# Patient Record
Sex: FEMALE | Race: WHITE | NOT HISPANIC OR LATINO | Employment: FULL TIME | URBAN - METROPOLITAN AREA
[De-identification: names, ages, dates, MRNs, and addresses within clinical notes are randomized per-mention and may not be internally consistent; named-entity substitution may affect disease eponyms.]

---

## 2017-06-22 ENCOUNTER — TRANSFERRED RECORDS (OUTPATIENT)
Dept: HEALTH INFORMATION MANAGEMENT | Facility: CLINIC | Age: 57
End: 2017-06-22

## 2017-06-22 LAB — PAP SMEAR - HIM PATIENT REPORTED: NEGATIVE

## 2018-04-25 ENCOUNTER — TELEPHONE (OUTPATIENT)
Dept: OTHER | Facility: CLINIC | Age: 58
End: 2018-04-25

## 2018-04-25 NOTE — TELEPHONE ENCOUNTER
4/25/2018    Call Regarding Onboarding ARE CHOICES     Attempt 1    Message on voicemail     Comments:           Outreach   AT

## 2018-10-03 ENCOUNTER — OFFICE VISIT (OUTPATIENT)
Dept: FAMILY MEDICINE | Facility: CLINIC | Age: 58
End: 2018-10-03
Payer: COMMERCIAL

## 2018-10-03 VITALS
WEIGHT: 121 LBS | HEIGHT: 63 IN | DIASTOLIC BLOOD PRESSURE: 60 MMHG | SYSTOLIC BLOOD PRESSURE: 120 MMHG | OXYGEN SATURATION: 96 % | BODY MASS INDEX: 21.44 KG/M2 | HEART RATE: 83 BPM

## 2018-10-03 DIAGNOSIS — F42.9 OBSESSIVE-COMPULSIVE DISORDER, UNSPECIFIED TYPE: Primary | ICD-10-CM

## 2018-10-03 DIAGNOSIS — G43.809 OTHER MIGRAINE WITHOUT STATUS MIGRAINOSUS, NOT INTRACTABLE: ICD-10-CM

## 2018-10-03 DIAGNOSIS — Z23 NEED FOR TDAP VACCINATION: ICD-10-CM

## 2018-10-03 DIAGNOSIS — Z80.3 FAMILY HISTORY OF MALIGNANT NEOPLASM OF BREAST: ICD-10-CM

## 2018-10-03 DIAGNOSIS — Z12.31 ENCOUNTER FOR SCREENING MAMMOGRAM FOR BREAST CANCER: ICD-10-CM

## 2018-10-03 DIAGNOSIS — F33.0 MILD RECURRENT MAJOR DEPRESSION (H): ICD-10-CM

## 2018-10-03 PROCEDURE — 90715 TDAP VACCINE 7 YRS/> IM: CPT | Performed by: FAMILY MEDICINE

## 2018-10-03 PROCEDURE — 90471 IMMUNIZATION ADMIN: CPT | Performed by: FAMILY MEDICINE

## 2018-10-03 PROCEDURE — 99203 OFFICE O/P NEW LOW 30 MIN: CPT | Mod: 25 | Performed by: FAMILY MEDICINE

## 2018-10-03 RX ORDER — SERTRALINE HYDROCHLORIDE 100 MG/1
50 TABLET, FILM COATED ORAL DAILY
Qty: 45 TABLET | Refills: 4 | Status: SHIPPED | OUTPATIENT
Start: 2018-10-03 | End: 2019-12-12

## 2018-10-03 RX ORDER — SUMATRIPTAN 100 MG/1
100 TABLET, FILM COATED ORAL
Qty: 18 TABLET | Refills: 5 | Status: SHIPPED | OUTPATIENT
Start: 2018-10-03

## 2018-10-03 NOTE — Clinical Note
[facility] colonoscopy 9/2009 normal Pap normal 6/22/17 [facility], Normal HPV Mammogram normal 6/22/17 [facility]

## 2018-10-03 NOTE — MR AVS SNAPSHOT
"              After Visit Summary   10/3/2018    Kasandra Stewart    MRN: [id number]           Patient Information     Date Of Birth          1960        Visit Information        Provider Department      10/3/2018 10:00 AM Sonny Miranda MD [facility]        Today's Diagnoses     Obsessive-compulsive disorder, unspecified type    -  1    Mild recurrent major depression (H)        Other migraine without status migrainosus, not intractable        Need for Tdap vaccination           Follow-ups after your visit        Who to contact     If you have questions or need follow up information about today's clinic visit or your schedule please contact [facility] directly at [phone].  Normal or non-critical lab and imaging results will be communicated to you by MyChart, letter or phone within 4 business days after the clinic has received the results. If you do not hear from us within 7 days, please contact the clinic through MyChart or phone. If you have a critical or abnormal lab result, we will notify you by phone as soon as possible.  Submit refill requests through [email or web address] or call your pharmacy and they will forward the refill request to us. Please allow 3 business days for your refill to be completed.          Additional Information About Your Visit        MyChart Information     [email or web address] lets you send messages to your doctor, view your test results, renew your prescriptions, schedule appointments and more. To sign up, go to [email or web address] . Click on \"Log in\" on the left side of the screen, which will take you to the Welcome page. Then click on \"Sign up Now\" on the right side of the page.     You will be asked to enter the access code listed below, as well as some personal information. Please follow the directions to create your username and password.     Your access code is: [email or web address]  Expires: 2019  9:49 AM     Your access code will  in 90 days. If you " "need help or a new code, please call your [facility] clinic or [phone].        Care EveryWhere ID     This is your Care EveryWhere ID. This could be used by other organizations to access your [facility] medical records  [id number]        Your Vitals Were     Pulse Height Pulse Oximetry Breastfeeding? BMI (Body Mass Index)       83 5' 3\" (1.6 m) 96% No 21.43 kg/m2        Blood Pressure from Last 3 Encounters:   10/03/18 120/60   11/11/11 102/75   10/01/07 104/60    Weight from Last 3 Encounters:   10/03/18 121 lb (54.9 kg)   11/11/11 127 lb (57.6 kg)   10/01/07 117 lb (53.1 kg)              We Performed the Following     TDAP VACCINE (ADACEL)          Today's Medication Changes          These changes are accurate as of 10/3/18 10:28 AM.  If you have any questions, ask your nurse or doctor.               These medicines have changed or have updated prescriptions.        Dose/Directions    sertraline 100 MG tablet   Commonly known as:  ZOLOFT   This may have changed:  See the new instructions.   Used for:  Obsessive-compulsive disorder, unspecified type, Mild recurrent major depression (H)   Changed by:  Sonny Miranda MD        Dose:  50 mg   Take 0.5 tablets (50 mg) by mouth daily   Quantity:  45 tablet   Refills:  4       SUMAtriptan 100 MG tablet   Commonly known as:  IMITREX   This may have changed:  See the new instructions.   Used for:  Other migraine without status migrainosus, not intractable   Changed by:  Sonny Miranda MD        Dose:  100 mg   Take 1 tablet (100 mg) by mouth at onset of headache for migraine May repeat in 2 hours. Max 2 tablets/24 hours.   Quantity:  18 tablet   Refills:  5            Where to get your medicines      These medications were sent to [facility]/pharmacy #[id number] - [place], [place] - [address]  [address] [address] [address]    Hours:  [facility] Phone:  [phone]     sertraline 100 MG tablet    SUMAtriptan 100 MG tablet                " Primary Care Provider Office Phone # Fax #    Sonny Miranda [phone] [address]  [facility] [address]        Equal Access to Services     RONNY AGRAWAL : Hadii aad ku hadkaylao Soomaali, waaxda luqadaha, qaybta kaalmada adeegyada, kaushal del rosariokira werner. So [facility] [phone].    ATENCIÓN: Si habla español, tiene a barriga disposición servicios gratuitos de asistencia lingüística. Llame al [phone].    We comply with applicable federal civil rights laws and [place] laws. We do not discriminate on the basis of race, color, national origin, age, disability, sex, sexual orientation, or gender identity.            Thank you!     Thank you for choosing [facility]  for your care. Our goal is always to provide you with excellent care. Hearing back from our patients is one way we can continue to improve our services. Please take a few minutes to complete the written survey that you may receive in the mail after your visit with us. Thank you!             Your Updated Medication List - Protect others around you: Learn how to safely use, store and throw away your medicines at www.disposemymeds.org.          This list is accurate as of 10/3/18 10:28 AM.  Always use your most recent med list.                   Brand Name Dispense Instructions for use Diagnosis    sertraline 100 MG tablet    ZOLOFT    45 tablet    Take 0.5 tablets (50 mg) by mouth daily    Obsessive-compulsive disorder, unspecified type, Mild recurrent major depression (H)       SUMAtriptan 100 MG tablet    IMITREX    18 tablet    Take 1 tablet (100 mg) by mouth at onset of headache for migraine May repeat in 2 hours. Max 2 tablets/24 hours.    Other migraine without status migrainosus, not intractable

## 2018-10-03 NOTE — PROGRESS NOTES
SUBJECTIVE:   Kasandra Stewart is a 58 year old female who presents to clinic today for the following health issues:    Establish Care.     Patient with history of depression and obsessive-compulsive disorder. Has been stable on sertraline for multiple years.     Patient reports two episodes of diarrhea with urgency this past year. She has inconsistent stools and bowel movements as well. Denies blood is stools.     Patient with history of migraines. The migraines are triggered by irregular sleep patterns and neck tightness. The pain is one sided and located behind her eye. Well controlled with Imitrex. Denies photophobia, phonophobia, nausea, vomiting.     Patient reports left little finger tenderness. She wonders if this is related to arthritis.     Problem list and histories reviewed & adjusted, as indicated.  Additional history: as documented    Patient Active Problem List   Diagnosis     Obsessive-compulsive disorder     Mild recurrent major depression (H)     Other migraine without status migrainosus, not intractable     Past Surgical History:   Procedure Laterality Date     GYN SURGERY         Social History   Substance Use Topics     Smoking status: Never Smoker     Smokeless tobacco: Never Used     Alcohol use No     Family History   Problem Relation Age of Onset     Breast Cancer Mother       when she was 49     Breast Cancer Father      had breast  cancer 2004     HEART DISEASE Maternal Grandmother      HEART DISEASE Maternal Grandfather      Cerebrovascular Disease Paternal Grandfather            Reviewed and updated as needed this visit by clinical staff  Tobacco  Allergies  Meds  Problems  Med Hx  Surg Hx  Fam Hx  Soc Hx        Reviewed and updated as needed this visit by Provider  Problems         ROS:  GI: as noted above   MUSCULOSKELETAL: as noted above   NEURO: POSITIVE for HX headaches-migraine  PSYCHIATRIC: NEGATIVE for changes in mood or affect    This document serves as a record of  "the services and decisions personally performed and made by Sonny Miranda MD. It was created on his behalf by Autumn Andersen, a trained medical scribe. The creation of this document is based on the provider's statements to the medical scribe.  Autumn Andersen 10:10 AM October 3, 2018    OBJECTIVE:     /60 (BP Location: Right arm, Patient Position: Chair, Cuff Size: Adult Regular)  Pulse 83  Ht 1.6 m (5' 3\")  Wt 54.9 kg (121 lb)  SpO2 96%  Breastfeeding? No  BMI 21.43 kg/m2  Body mass index is 21.43 kg/(m^2).  GENERAL: healthy, alert and no distress  RESP: lungs clear to auscultation - no rales, rhonchi or wheezes  CV: regular rate and rhythm, normal S1 S2, no S3 or S4, no murmur, click or rub, no peripheral edema and peripheral pulses strong    ASSESSMENT/PLAN:     1. Obsessive-compulsive disorder, unspecified type  Continue current medication.  - sertraline (ZOLOFT) 100 MG tablet; Take 0.5 tablets (50 mg) by mouth daily  Dispense: 45 tablet; Refill: 4    2. Mild recurrent major depression (H)  Continue current medication as this is stable.  - sertraline (ZOLOFT) 100 MG tablet; Take 0.5 tablets (50 mg) by mouth daily  Dispense: 45 tablet; Refill: 4    3. Other migraine without status migrainosus, not intractable  Continue Imitrex as needed.  - SUMAtriptan (IMITREX) 100 MG tablet; Take 1 tablet (100 mg) by mouth at onset of headache for migraine May repeat in 2 hours. Max 2 tablets/24 hours.  Dispense: 18 tablet; Refill: 5    4. Need for Tdap vaccination  - TDAP VACCINE (ADACEL)    The information in this document, created by the medical scribe for me, accurately reflects the services I personally performed and the decisions made by me. I have reviewed and approved this document for accuracy prior to leaving the patient care area.  October 3, 2018 10:30 AM    Sonny Miranda MD  [facility]    "

## 2018-10-31 ENCOUNTER — HOSPITAL ENCOUNTER (OUTPATIENT)
Dept: MAMMOGRAPHY | Facility: CLINIC | Age: 58
Discharge: HOME | End: 2018-10-31
Attending: FAMILY MEDICINE | Admitting: FAMILY MEDICINE
Payer: COMMERCIAL

## 2018-10-31 DIAGNOSIS — Z80.3 FAMILY HISTORY OF MALIGNANT NEOPLASM OF BREAST: ICD-10-CM

## 2018-10-31 DIAGNOSIS — Z12.31 ENCOUNTER FOR SCREENING MAMMOGRAM FOR BREAST CANCER: ICD-10-CM

## 2018-10-31 PROCEDURE — 77067 SCR MAMMO BI INCL CAD: CPT

## 2018-11-08 ENCOUNTER — TELEPHONE (OUTPATIENT)
Dept: FAMILY MEDICINE | Facility: CLINIC | Age: 58
End: 2018-11-08

## 2018-11-08 NOTE — TELEPHONE ENCOUNTER
Pt calling stating she got a different  for her sertraline and wondering if she can change medications or get the brand name due to diarrhea.  She noticed that since the pharmacy dispensed a different  has had diarrhea issues but never did in the past.  Advised she call the pharmacy to discuss the issue with the  they use and see if they can order from the old  as the clinic does not have control over that.  If this is not an option she was advise to schedule a telephone visit to discuss a possibly change in medication.    Pt expressed understanding and acceptance of the plan.  Pt had no further questions at this time.  Advised can call back to clinic at any time with concerns.       Adriana Flowers RN, BSN

## 2019-10-10 ENCOUNTER — TELEPHONE (OUTPATIENT)
Dept: FAMILY MEDICINE | Facility: CLINIC | Age: 59
End: 2019-10-10

## 2019-10-10 NOTE — LETTER
October 10, 2019      Kasandra Stewart  [address]  [place] [address]        Dear Kasandra,     Our records indicate that you may be due for preventative health care services.  Please make an appointment or call to set up the following tests as recommended.  If you have already had this testing done at another clinic please contact us to help us update our records to reflect the preventative care that you have had.    Colon cancer screen (colonoscopy) - Recommended every ten years for everyone age 50 and older. Screening is done by a colonoscopy every 10 years starting at age 50 or earlier if you have a family history of colon cancer.  If you cannot or do not want to have a colonoscopy you can opt to do an annual fecal occult blood immunoassay test (FIT stool test). We strongly urge our patients to consider having a colonoscopy done, which is the best screening test available and only needs to be done every 10 years if normal.                                                                                                                                               Thank you for choosing [facility]. We appreciate the opportunity to serve you and look forward to supporting your healthcare needs in the future.    If you have any questions or concerns, please contact us at [phone].        Sincerely,      Sonny Miranda MD

## 2019-12-12 DIAGNOSIS — F33.0 MILD RECURRENT MAJOR DEPRESSION (H): ICD-10-CM

## 2019-12-12 DIAGNOSIS — F42.9 OBSESSIVE-COMPULSIVE DISORDER, UNSPECIFIED TYPE: ICD-10-CM

## 2019-12-12 RX ORDER — SERTRALINE HYDROCHLORIDE 100 MG/1
TABLET, FILM COATED ORAL
Qty: 15 TABLET | Refills: 0 | Status: SHIPPED | OUTPATIENT
Start: 2019-12-12 | End: 2020-01-09

## 2020-01-09 DIAGNOSIS — F33.0 MILD RECURRENT MAJOR DEPRESSION (H): ICD-10-CM

## 2020-01-09 DIAGNOSIS — F42.9 OBSESSIVE-COMPULSIVE DISORDER, UNSPECIFIED TYPE: ICD-10-CM

## 2020-01-09 RX ORDER — SERTRALINE HYDROCHLORIDE 100 MG/1
TABLET, FILM COATED ORAL
Qty: 30 TABLET | Refills: 0 | Status: SHIPPED | OUTPATIENT
Start: 2020-01-09

## 2020-01-09 NOTE — TELEPHONE ENCOUNTER
LM again today for call back-   Pt has received angelica RF and not followed up.     Do you want to give another limited RF     LOV 10/3/2018    Letter sent today     Naya Ureña RN

## 2020-01-09 NOTE — LETTER
January 9, 2020      Kasandra Stewart  [address]  [place] [facility] [address]        Dear Kasandra,     We received a request to refill your sertraline again today.   We have made several attempts to reach you by phone but have not heard back from you. You are over due for a recheck appointment with your provider.  You have not been seen since October of 2018.     You can schedule an appointment by calling the clinic at [phone]    If you have any further questions or concerns please call the clinic.      Sincerely,        Sonny Miranda MD/amp

## 2023-04-18 ENCOUNTER — HOSPITAL ENCOUNTER (OUTPATIENT)
Dept: MAMMOGRAPHY | Facility: CLINIC | Age: 63
Discharge: HOME | End: 2023-04-18
Admitting: FAMILY MEDICINE
Payer: COMMERCIAL

## 2023-04-18 DIAGNOSIS — Z12.31 VISIT FOR SCREENING MAMMOGRAM: ICD-10-CM

## 2023-04-18 PROCEDURE — 77067 SCR MAMMO BI INCL CAD: CPT

## 2023-05-01 ENCOUNTER — OFFICE VISIT (OUTPATIENT)
Dept: URGENT CARE | Facility: URGENT CARE | Age: 63
End: 2023-05-01
Payer: OTHER MISCELLANEOUS

## 2023-05-01 VITALS
SYSTOLIC BLOOD PRESSURE: 118 MMHG | DIASTOLIC BLOOD PRESSURE: 72 MMHG | WEIGHT: 122 LBS | HEART RATE: 86 BPM | OXYGEN SATURATION: 98 % | BODY MASS INDEX: 21.61 KG/M2 | TEMPERATURE: 98.3 F

## 2023-05-01 DIAGNOSIS — S01.81XA CHIN LACERATION, INITIAL ENCOUNTER: Primary | ICD-10-CM

## 2023-05-01 PROCEDURE — 12001 RPR S/N/AX/GEN/TRNK 2.5CM/<: CPT | Performed by: PHYSICIAN ASSISTANT

## 2023-05-01 NOTE — PATIENT INSTRUCTIONS
Patient was educated on the natural course of injury.  Keep wound dry and clean. Wash area with soap and water. Watch for signs of infection. Conservative measures discussed including over-the-counter Tylenol as needed for pain. See your primary care provider in 5 days for suture removal or sooner as needed. Seek emergency care if you develop fever, streaking, severe pain or redness.

## 2023-05-01 NOTE — LETTER
May 1, 2023      Kasandra Stewart  [address]    [place] [address]        To Whom It May Concern:    Kasandra Stewart was seen in our clinic. She may return to work with the following: affected area must be kept clean and dry for a week.      Sincerely,        Whit Burch PA-C

## 2023-05-01 NOTE — PROGRESS NOTES
URGENT CARE VISIT:    SUBJECTIVE:   Kasandra Stewart is a 62 year old female who presents to the clinic with a laceration on the chin sustained today.  This is a non-work related injury.  Mechanism of injury: fell.    Associated symptoms: Denies loss of consciousness, vomiting or confusion.  Denies numbness, weakness, or loss of function  Last tetanus booster within 10 years: yes    OBJECTIVE:  VITALS: /72 (BP Location: Right arm, Patient Position: Chair, Cuff Size: Adult Regular)   Pulse 86   Temp 98.3  F (36.8  C) (Oral)   Wt 55.3 kg (122 lb)   SpO2 98%   BMI 21.61 kg/m    General: WDWN in NAD  EYES: PERRLA, EOMI  Size of laceration: 2 centimeters  Location of laceration: chin  Characteristics of the laceration: bleeding- mild and clean  Sensation to light touch intact: yes  NEURO: alert and oriented      ASSESSMENT:    ICD-10-CM    1. Chin laceration, initial encounter  S01.81XA REPAIR SUPERFICIAL, WOUND BODY < =2.5CM          PLAN:  PROCEDURE NOTE:  Wound cleaned with HIBICLENS  Wound was locally injected with 2 cc's of Lidocaine 1% with epinephrine  Laceration was closed using 2 5-0 nylon interrupted sutures  Wound was dressed with gauze.     Patient Instructions   Patient was educated on the natural course of injury.  Keep wound dry and clean. Wash area with soap and water. Watch for signs of infection. Conservative measures discussed including over-the-counter Tylenol as needed for pain. See your primary care provider in 5 days for suture removal or sooner as needed. Seek emergency care if you develop fever, streaking, severe pain or redness.       Patient verbalized understanding and is agreeable to plan. The patient was discharged ambulatory and in stable condition.    Whit Burch PA-C ....................  5/1/2023   5:53 PM

## 2023-05-08 ENCOUNTER — OFFICE VISIT (OUTPATIENT)
Dept: URGENT CARE | Facility: URGENT CARE | Age: 63
End: 2023-05-08

## 2023-05-08 VITALS
DIASTOLIC BLOOD PRESSURE: 76 MMHG | BODY MASS INDEX: 21.38 KG/M2 | WEIGHT: 120.7 LBS | TEMPERATURE: 97.8 F | HEART RATE: 73 BPM | OXYGEN SATURATION: 97 % | SYSTOLIC BLOOD PRESSURE: 112 MMHG

## 2023-05-08 DIAGNOSIS — Z48.02 VISIT FOR SUTURE REMOVAL: Primary | ICD-10-CM

## 2023-05-08 PROCEDURE — 99024 POSTOP FOLLOW-UP VISIT: CPT | Performed by: PHYSICIAN ASSISTANT

## 2023-05-08 NOTE — PROGRESS NOTES
Assessment & Plan     Visit for suture removal  PI given and discussed.  Expect mild tenderness to resolve over time but disucssed it should not be woresning and should not have redness, discharge, increased pain. If she notes any of these problems we should recheck.  Allow steristrips to fall off on their own. RTC for persistent or worsening sx.       Lv Uc Provider  [facility] MICHAEL Slaughter is a 62 year old female who presents to clinic today for the following health issues:  Chief Complaint   Patient presents with     Suture Removal     Workman's Comp. 05/01/23 happened at 1:30pm and seen at 5:00 in the evening. Returning to have stitches removed.      HPI: Patient presents to urgent care for suture removal.  Pt has mild ongoing tenderness, not worsening.   No fevers.  No drainage.      Review of Systems  Constitutional, HEENT, cardiovascular, pulmonary, gi and gu systems are negative, except as otherwise noted.      Objective    /76   Pulse 73   Temp 97.8  F (36.6  C) (Oral)   Wt 54.7 kg (120 lb 11.2 oz)   SpO2 97%   BMI 21.38 kg/m    Physical Exam   Suture in place, mild crusting but no erythema, no discharge    Sutures removed without difficulty and steri strips placed.    Pt tolerated well.

## 2023-06-01 ENCOUNTER — HEALTH MAINTENANCE LETTER (OUTPATIENT)
Age: 63
End: 2023-06-01

## 2024-01-29 NOTE — TELEPHONE ENCOUNTER
Prescription approved per [facility] Refill Protocol.  For 30 day angelica and LM pt needs OV     Naya Ureña RN     No

## 2024-01-31 ENCOUNTER — OFFICE VISIT (OUTPATIENT)
Dept: URGENT CARE | Facility: URGENT CARE | Age: 64
End: 2024-01-31
Payer: COMMERCIAL

## 2024-01-31 ENCOUNTER — ANCILLARY PROCEDURE (OUTPATIENT)
Dept: GENERAL RADIOLOGY | Facility: CLINIC | Age: 64
End: 2024-01-31
Attending: FAMILY MEDICINE
Payer: COMMERCIAL

## 2024-01-31 VITALS
SYSTOLIC BLOOD PRESSURE: 108 MMHG | TEMPERATURE: 98.6 F | OXYGEN SATURATION: 96 % | DIASTOLIC BLOOD PRESSURE: 73 MMHG | HEART RATE: 95 BPM

## 2024-01-31 DIAGNOSIS — R10.30 LOWER ABDOMINAL PAIN: Primary | ICD-10-CM

## 2024-01-31 DIAGNOSIS — R10.30 LOWER ABDOMINAL PAIN: ICD-10-CM

## 2024-01-31 PROCEDURE — 74019 RADEX ABDOMEN 2 VIEWS: CPT | Mod: TC | Performed by: RADIOLOGY

## 2024-01-31 PROCEDURE — 99213 OFFICE O/P EST LOW 20 MIN: CPT | Performed by: FAMILY MEDICINE

## 2024-01-31 NOTE — PROGRESS NOTES
ICD-10-CM    1. Lower abdominal pain  R10.30 XR Abdomen 2 Views        Could be related to mild constipation.  No signs of serious intra-abdominal pathology on tonight's exam, but patient will need additional workup if symptoms persist in order to rule out gynecological sources for the discomfort and may benefit from referral to GI if gyn workup negative and symptoms not resolving.    PLAN:  Patient Instructions   Miralax once daily per package instructions for the next 5 days or so to help clear the colon.    Drink plenty of fluids.      If any new symptoms, including urinary changes or fever starting, please return to urgent care.    If symptoms not improving at all after Miralax clean-out, please follow up with primary care or OB/gyn.    SUBJECTIVE:  Kasandra Stewart is a 63 year old female who presents to  today with lower abdominal pain intermittently since Friday night (5 days ago).  No fevers.  Tried using a stool softener and a laxative, which resulted in a soft stool.  Pain feels crampy and she notes that she had rectoanal cramping that was very intense.  Appetite hasn't changed significantly.  No vomiting.  No blood in stools.  Stomach occasionally feels upset/unsettled.  No recent dietary or medication/supplement changes.  No urinary symptoms.  Denies any vaginal spotting.    Chart review shows that the patient had a colonoscopy in 2020.    OBJECTIVE:  /73   Pulse 95   Temp 98.6  F (37  C) (Oral)   SpO2 96%   GEN: well-appearing, in NAD  Lungs:  CTAB  CV:  RRR, no murmur noted  Abd: mild lower abdominal pain without any distention, guarding or rebound.  Active bowel sounds.    Results for orders placed or performed in visit on 01/31/24   XR Abdomen 2 Views     Status: None    Narrative    EXAM: XR ABDOMEN 2 VIEWS  LOCATION: [facility]  DATE: 1/31/2024    INDICATION: lower abdominal pain x 5 days intermittently, r o obstruction and eval stool burden  COMPARISON: CT exam  performed 12/06/2007      Impression    IMPRESSION: No intraperitoneal air. No dilated air-filled loops of small bowel. Mild amount of stool noted in portions of the colon.

## 2024-02-01 NOTE — PATIENT INSTRUCTIONS
Miralax once daily per package instructions for the next 5 days or so to help clear the colon.    Drink plenty of fluids.      If any new symptoms, including urinary changes or fever starting, please return to urgent care.    If symptoms not improving at all after Miralax clean-out, please follow up with primary care or OB/gyn.

## 2024-04-06 ENCOUNTER — HEALTH MAINTENANCE LETTER (OUTPATIENT)
Age: 64
End: 2024-04-06

## 2024-05-10 ENCOUNTER — HOSPITAL ENCOUNTER (OUTPATIENT)
Dept: MAMMOGRAPHY | Facility: CLINIC | Age: 64
Discharge: HOME | End: 2024-05-10
Payer: COMMERCIAL

## 2024-05-10 DIAGNOSIS — Z12.31 VISIT FOR SCREENING MAMMOGRAM: ICD-10-CM

## 2024-05-10 PROCEDURE — 77063 BREAST TOMOSYNTHESIS BI: CPT

## 2024-11-02 ENCOUNTER — HEALTH MAINTENANCE LETTER (OUTPATIENT)
Age: 64
End: 2024-11-02

## 2025-05-14 ENCOUNTER — HOSPITAL ENCOUNTER (OUTPATIENT)
Dept: MAMMOGRAPHY | Facility: CLINIC | Age: 65
Discharge: HOME | End: 2025-05-14
Payer: MEDICARE

## 2025-05-14 DIAGNOSIS — Z12.31 VISIT FOR SCREENING MAMMOGRAM: ICD-10-CM

## 2025-05-14 PROCEDURE — 77063 BREAST TOMOSYNTHESIS BI: CPT
